# Patient Record
Sex: FEMALE | URBAN - METROPOLITAN AREA
[De-identification: names, ages, dates, MRNs, and addresses within clinical notes are randomized per-mention and may not be internally consistent; named-entity substitution may affect disease eponyms.]

---

## 2018-05-14 ENCOUNTER — APPOINTMENT (RX ONLY)
Age: 38
Setting detail: DERMATOLOGY
End: 2018-05-14

## 2018-05-14 DIAGNOSIS — L81.4 OTHER MELANIN HYPERPIGMENTATION: ICD-10-CM | Status: STABLE

## 2018-05-14 DIAGNOSIS — L91.8 OTHER HYPERTROPHIC DISORDERS OF THE SKIN: ICD-10-CM

## 2018-05-14 DIAGNOSIS — D22 MELANOCYTIC NEVI: ICD-10-CM

## 2018-05-14 PROBLEM — D22.71 MELANOCYTIC NEVI OF RIGHT LOWER LIMB, INCLUDING HIP: Status: ACTIVE | Noted: 2018-05-14

## 2018-05-14 PROBLEM — D22.62 MELANOCYTIC NEVI OF LEFT UPPER LIMB, INCLUDING SHOULDER: Status: ACTIVE | Noted: 2018-05-14

## 2018-05-14 PROCEDURE — ? DIAGNOSIS COMMENT

## 2018-05-14 PROCEDURE — ? OTHER

## 2018-05-14 PROCEDURE — 99203 OFFICE O/P NEW LOW 30 MIN: CPT

## 2018-05-14 PROCEDURE — ? COUNSELING

## 2018-05-14 ASSESSMENT — LOCATION SIMPLE DESCRIPTION DERM
LOCATION SIMPLE: LEFT AXILLARY VAULT
LOCATION SIMPLE: RIGHT THIGH
LOCATION SIMPLE: LEFT ANTERIOR AXILLA

## 2018-05-14 ASSESSMENT — LOCATION DETAILED DESCRIPTION DERM
LOCATION DETAILED: LEFT AXILLARY VAULT
LOCATION DETAILED: RIGHT ANTERIOR MEDIAL PROXIMAL THIGH
LOCATION DETAILED: LEFT ANTERIOR AXILLA

## 2018-05-14 ASSESSMENT — LOCATION ZONE DERM
LOCATION ZONE: LEG
LOCATION ZONE: AXILLAE

## 2018-05-14 NOTE — PROCEDURE: OTHER
Detail Level: Simple
Note Text (......Xxx Chief Complaint.): This diagnosis correlates with the
Other (Free Text): Photographs taken for comparison

## 2018-05-14 NOTE — PROCEDURE: DIAGNOSIS COMMENT
Detail Level: Simple
Comment: The hyperpigmentation on fingers is stable. Appears to be post-inflammatory, but there was no preceding rash. Sun protection discussed. Photos taken for comparison for next time. Notify clinic with worsening pigmentation or if a rash develops. There are no nail fold telangiectasias.

## 2018-05-14 NOTE — HPI: SKIN LESION
How Severe Is Your Skin Lesion?: mild
Has Your Skin Lesion Been Treated?: not been treated
Is This A New Presentation, Or A Follow-Up?: Mole
Additional History: She is following up on the hyperpigmentation on her fingers, she is just using OTC cortisone on them.  She doesnt think they are improving but also doesnt know

## 2020-03-02 ENCOUNTER — APPOINTMENT (RX ONLY)
Age: 40
Setting detail: DERMATOLOGY
End: 2020-03-02

## 2020-03-02 DIAGNOSIS — D49.2 NEOPLASM OF UNSPECIFIED BEHAVIOR OF BONE, SOFT TISSUE, AND SKIN: ICD-10-CM

## 2020-03-02 DIAGNOSIS — K13.0 DISEASES OF LIPS: ICD-10-CM

## 2020-03-02 PROBLEM — L20.84 INTRINSIC (ALLERGIC) ECZEMA: Status: ACTIVE | Noted: 2020-03-02

## 2020-03-02 PROBLEM — L70.0 ACNE VULGARIS: Status: ACTIVE | Noted: 2020-03-02

## 2020-03-02 PROCEDURE — 99213 OFFICE O/P EST LOW 20 MIN: CPT

## 2020-03-02 PROCEDURE — ? DIAGNOSIS COMMENT

## 2020-03-02 PROCEDURE — ? COUNSELING

## 2020-03-02 ASSESSMENT — LOCATION DETAILED DESCRIPTION DERM
LOCATION DETAILED: LEFT SUPERIOR VERMILION LIP
LOCATION DETAILED: RIGHT INFERIOR VERMILION LIP

## 2020-03-02 ASSESSMENT — LOCATION ZONE DERM: LOCATION ZONE: LIP

## 2020-03-02 ASSESSMENT — LOCATION SIMPLE DESCRIPTION DERM
LOCATION SIMPLE: RIGHT LIP
LOCATION SIMPLE: LEFT LIP

## 2020-03-02 NOTE — PROCEDURE: DIAGNOSIS COMMENT
Detail Level: Simple
Comment: Has some fissures as well.  Will have her use 2.5% hydrocortisone ointment for 2 weeks. Also recommended Aquaphor and Vaseline. Follow-up in one month, sooner if needed.
Comment: dark macule right lower lip. Fissure within the macule, as well as adjacent to it. Photo taken. F/u in one month to recheck after treating cheilitis. Will consider bx at that time. The lesion is most consistent with a labial melanotic macule.
Detail Level: Zone

## 2020-07-23 ENCOUNTER — APPOINTMENT (RX ONLY)
Age: 40
Setting detail: DERMATOLOGY
End: 2020-07-23

## 2020-07-23 VITALS — TEMPERATURE: 97.9 F

## 2020-07-23 DIAGNOSIS — D22 MELANOCYTIC NEVI: ICD-10-CM

## 2020-07-23 DIAGNOSIS — D49.2 NEOPLASM OF UNSPECIFIED BEHAVIOR OF BONE, SOFT TISSUE, AND SKIN: ICD-10-CM

## 2020-07-23 PROBLEM — D22.39 MELANOCYTIC NEVI OF OTHER PARTS OF FACE: Status: ACTIVE | Noted: 2020-07-23

## 2020-07-23 PROCEDURE — ? BIOPSY BY PUNCH METHOD

## 2020-07-23 PROCEDURE — 99212 OFFICE O/P EST SF 10 MIN: CPT | Mod: 25

## 2020-07-23 PROCEDURE — 40490 BIOPSY OF LIP: CPT

## 2020-07-23 PROCEDURE — ? COUNSELING

## 2020-07-23 ASSESSMENT — LOCATION SIMPLE DESCRIPTION DERM
LOCATION SIMPLE: LEFT CHEEK
LOCATION SIMPLE: RIGHT LIP

## 2020-07-23 ASSESSMENT — LOCATION DETAILED DESCRIPTION DERM
LOCATION DETAILED: RIGHT INFERIOR VERMILION LIP
LOCATION DETAILED: LEFT INFERIOR CENTRAL MALAR CHEEK

## 2020-07-23 ASSESSMENT — LOCATION ZONE DERM
LOCATION ZONE: LIP
LOCATION ZONE: FACE

## 2020-07-23 NOTE — HPI: FREE FORM (FOLLOW UP HISTORY)
How Severe Is Your Skin Condition?: moderate
What Brings You In Today For Follow Up? (This Is An Xx Year Old Patient Who Is Following Up For:): ALPHONSE
Where On Your Body Is It? (Located On:): Right lower lip
Since The Treatment Is Your Condition Better, Worse, Or Unchanged? (Since The Treatment, The Condition Is:): Patient thinks this lesion has enlarged.  She presents today for a biopsy.

## 2020-07-29 ENCOUNTER — APPOINTMENT (RX ONLY)
Age: 40
Setting detail: DERMATOLOGY
End: 2020-07-29

## 2020-07-29 VITALS — TEMPERATURE: 97.9 F

## 2020-07-29 DIAGNOSIS — Z48.00 ENCOUNTER FOR CHANGE OR REMOVAL OF NONSURGICAL WOUND DRESSING: ICD-10-CM

## 2020-07-29 PROCEDURE — ? SUTURE REMOVAL (NO GLOBAL PERIOD)

## 2020-07-29 PROCEDURE — 99212 OFFICE O/P EST SF 10 MIN: CPT

## 2020-07-29 ASSESSMENT — LOCATION DETAILED DESCRIPTION DERM: LOCATION DETAILED: LEFT INFERIOR VERMILION LIP

## 2020-07-29 ASSESSMENT — LOCATION SIMPLE DESCRIPTION DERM: LOCATION SIMPLE: LEFT LIP

## 2020-07-29 ASSESSMENT — LOCATION ZONE DERM: LOCATION ZONE: LIP

## 2021-04-13 ENCOUNTER — APPOINTMENT (RX ONLY)
Age: 41
Setting detail: DERMATOLOGY
End: 2021-04-13

## 2021-04-13 VITALS — TEMPERATURE: 97.5 F

## 2021-04-13 DIAGNOSIS — L81.4 OTHER MELANIN HYPERPIGMENTATION: ICD-10-CM

## 2021-04-13 PROCEDURE — ? OTHER

## 2021-04-13 PROCEDURE — 99212 OFFICE O/P EST SF 10 MIN: CPT

## 2021-04-13 PROCEDURE — ? COUNSELING

## 2021-04-13 ASSESSMENT — LOCATION DETAILED DESCRIPTION DERM: LOCATION DETAILED: RIGHT INFERIOR VERMILION LIP

## 2021-04-13 ASSESSMENT — LOCATION SIMPLE DESCRIPTION DERM: LOCATION SIMPLE: RIGHT INFERIOR LIP

## 2021-04-13 ASSESSMENT — LOCATION ZONE DERM: LOCATION ZONE: LIP

## 2021-04-13 NOTE — PROCEDURE: OTHER
Render Risk Assessment In Note?: no
Note Text (......Xxx Chief Complaint.): This diagnosis correlates with the
Detail Level: Zone
Other (Free Text): Lesion has recurred after biopsy. Lesion was treated with liquid nitrogen. May not completely resolve with one treatment. She will f/u in 6 weeks, sooner if needed. [facility] handout on post-liquid nitrogen treatment was given and reviewed. Use Aquaphor and sun protection. Expected reaction including crusting, blistering, incomplete removal was discussed. Follow-up in 6 weeks for possible additional treatment, sooner if needed

## 2021-04-13 NOTE — HPI: FREE FORM (FOLLOW UP HISTORY)
What Brings You In Today For Follow Up? (This Is An Xx Year Old Patient Who Is Following Up For:): labial melanotic macule
Where On Your Body Is It? (Located On:): right lower lip
What Was It Treated With? (The Condition Was Treated With:): biopsy
Since The Treatment Is Your Condition Better, Worse, Or Unchanged? (Since The Treatment, The Condition Is:): recurring/enlarging

## 2021-06-07 ENCOUNTER — APPOINTMENT (RX ONLY)
Age: 41
Setting detail: DERMATOLOGY
End: 2021-06-07

## 2021-06-07 VITALS — TEMPERATURE: 97.1 F

## 2021-06-07 DIAGNOSIS — L81.4 OTHER MELANIN HYPERPIGMENTATION: ICD-10-CM

## 2021-06-07 DIAGNOSIS — M67.4 GANGLION: ICD-10-CM

## 2021-06-07 DIAGNOSIS — D22 MELANOCYTIC NEVI: ICD-10-CM

## 2021-06-07 PROBLEM — M67.442 GANGLION, LEFT HAND: Status: ACTIVE | Noted: 2021-06-07

## 2021-06-07 PROBLEM — L70.0 ACNE VULGARIS: Status: ACTIVE | Noted: 2021-06-07

## 2021-06-07 PROBLEM — D22.71 MELANOCYTIC NEVI OF RIGHT LOWER LIMB, INCLUDING HIP: Status: ACTIVE | Noted: 2021-06-07

## 2021-06-07 PROCEDURE — ? OTHER

## 2021-06-07 PROCEDURE — ? DIAGNOSIS COMMENT

## 2021-06-07 PROCEDURE — ? COUNSELING

## 2021-06-07 PROCEDURE — 99213 OFFICE O/P EST LOW 20 MIN: CPT

## 2021-06-07 ASSESSMENT — LOCATION ZONE DERM
LOCATION ZONE: LEG
LOCATION ZONE: LIP
LOCATION ZONE: FINGER

## 2021-06-07 ASSESSMENT — LOCATION DETAILED DESCRIPTION DERM
LOCATION DETAILED: RIGHT ANTERIOR DISTAL THIGH
LOCATION DETAILED: LEFT DISTAL PALMAR SMALL FINGER
LOCATION DETAILED: RIGHT INFERIOR VERMILION LIP

## 2021-06-07 ASSESSMENT — LOCATION SIMPLE DESCRIPTION DERM
LOCATION SIMPLE: RIGHT INFERIOR LIP
LOCATION SIMPLE: RIGHT THIGH
LOCATION SIMPLE: LEFT SMALL FINGER

## 2021-06-07 NOTE — PROCEDURE: OTHER
Detail Level: Zone
Note Text (......Xxx Chief Complaint.): This diagnosis correlates with the
Other (Free Text): Small amount of lesion remaining, which was treated with LN2 today. Wound care handout given. notify clinic with recurrence. Importance of sun protection discussed
Render Risk Assessment In Note?: no

## 2021-06-07 NOTE — HPI: FREE FORM (FOLLOW UP HISTORY)
What Brings You In Today For Follow Up? (This Is An Xx Year Old Patient Who Is Following Up For:): Labial melanotic macule
Where On Your Body Is It? (Located On:): Right inferior lip
What Was It Treated With? (The Condition Was Treated With:): Liquid nitrogen
Since The Treatment Is Your Condition Better, Worse, Or Unchanged? (Since The Treatment, The Condition Is:): Better, she states the lesion is smaller.\\n\\nShe would also like a mole on the right leg rechecked.  She thinks the mole may have enlarged.

## 2021-06-07 NOTE — PROCEDURE: DIAGNOSIS COMMENT
Comment: Photo taken today for monitoring purposes. Nevus appears benign
Render Risk Assessment In Note?: no
Detail Level: Zone
Detail Level: Simple
Comment: she noticed it a few months ago. Pain, no problems with movement.\\nIf it changes, grows, or becomes painful she should see an orthopedic hand surgeon.

## 2022-08-31 ENCOUNTER — APPOINTMENT (RX ONLY)
Age: 42
Setting detail: DERMATOLOGY
End: 2022-08-31

## 2022-08-31 DIAGNOSIS — L91.8 OTHER HYPERTROPHIC DISORDERS OF THE SKIN: ICD-10-CM

## 2022-08-31 DIAGNOSIS — L81.4 OTHER MELANIN HYPERPIGMENTATION: ICD-10-CM

## 2022-08-31 PROCEDURE — 99212 OFFICE O/P EST SF 10 MIN: CPT

## 2022-08-31 PROCEDURE — ? COUNSELING

## 2022-08-31 PROCEDURE — ? LIQUID NITROGEN (COSMETIC)

## 2022-08-31 ASSESSMENT — LOCATION DETAILED DESCRIPTION DERM
LOCATION DETAILED: LEFT SUPERIOR VERMILION LIP
LOCATION DETAILED: RIGHT CENTRAL LATERAL NECK

## 2022-08-31 ASSESSMENT — LOCATION SIMPLE DESCRIPTION DERM
LOCATION SIMPLE: LEFT SUPERIOR LIP
LOCATION SIMPLE: NECK

## 2022-08-31 ASSESSMENT — LOCATION ZONE DERM
LOCATION ZONE: LIP
LOCATION ZONE: NECK

## 2022-08-31 NOTE — PROCEDURE: LIQUID NITROGEN (COSMETIC)
Spray Paint Technique: No
Consent: The patient's consent was obtained including but not limited to risks of crusting, scabbing, blistering, scarring, darker or lighter pigmentary change, recurrence, incomplete removal and infection. The patient understands that the procedure is cosmetic in nature and is not covered by insurance.
Post-Care Instructions: Pt given [facility] handout on post liquid nitrogen care.
Price (Use Numbers Only, No Special Characters Or $): 0
Show Spray Paint Technique Variable?: Yes
Spray Paint Text: The liquid nitrogen was applied to the skin utilizing a spray paint frosting technique.
Billing Information: Bill by Static Price
Detail Level: Simple

## 2023-10-13 NOTE — PROCEDURE: BIOPSY BY PUNCH METHOD
Problem: Safety - Adult  Goal: Free from fall injury  Recent Flowsheet Documentation  Taken 10/12/2023 1800 by Modesto Villegas RN  Free From Fall Injury: Instruct family/caregiver on patient safety     Problem: Discharge Planning  Goal: Discharge to home or other facility with appropriate resources  Recent Flowsheet Documentation  Taken 10/12/2023 1800 by Modesto Villegas RN  Discharge to home or other facility with appropriate resources: Identify barriers to discharge with patient and caregiver
Render Post-Care Instructions In Note?: yes
Anesthesia Type: 2% lidocaine with epinephrine
Render Path Notes In Note?: No
Hemostasis: Pressure
Home Suture Removal Text: .
Post-Care Instructions: Patient given [facility] handout for post biopsy care instructions.
Biopsy Type: H and E
Size Of Lesion In Cm (Optional): 0
Information: Selecting Yes will display possible errors in your note based on the variables you have selected. This validation is only offered as a suggestion for you. PLEASE NOTE THAT THE VALIDATION TEXT WILL BE REMOVED WHEN YOU FINALIZE YOUR NOTE. IF YOU WANT TO FAX A PRELIMINARY NOTE YOU WILL NEED TO TOGGLE THIS TO 'NO' IF YOU DO NOT WANT IT IN YOUR FAXED NOTE.
Suture Removal: 7 days
Billing Type: Third-Party Bill
Anesthesia Volume In Cc: 4
Dressing: pressure dressing with telfa
Detail Level: Detailed
Wound Care: Aquaphor
Epidermal Sutures: 5-0 Surgipro
Consent: Verbal consent was obtained and risks were reviewed including but not limited to scarring, infection, bleeding, scabbing, incomplete removal, nerve damage and allergy to anesthesia.
Punch Size In Mm: 3.5
Notification Instructions: Patient will be notified of biopsy results. However, patient instructed to call the office if not contacted within 2 weeks.